# Patient Record
Sex: FEMALE | NOT HISPANIC OR LATINO
[De-identification: names, ages, dates, MRNs, and addresses within clinical notes are randomized per-mention and may not be internally consistent; named-entity substitution may affect disease eponyms.]

---

## 2019-03-11 PROBLEM — Z00.00 ENCOUNTER FOR PREVENTIVE HEALTH EXAMINATION: Status: ACTIVE | Noted: 2019-03-11

## 2019-03-12 ENCOUNTER — APPOINTMENT (OUTPATIENT)
Dept: OTOLARYNGOLOGY | Facility: CLINIC | Age: 59
End: 2019-03-12

## 2019-03-22 ENCOUNTER — APPOINTMENT (OUTPATIENT)
Dept: OTOLARYNGOLOGY | Facility: CLINIC | Age: 59
End: 2019-03-22
Payer: MEDICAID

## 2019-03-22 ENCOUNTER — LABORATORY RESULT (OUTPATIENT)
Age: 59
End: 2019-03-22

## 2019-03-22 VITALS
SYSTOLIC BLOOD PRESSURE: 99 MMHG | DIASTOLIC BLOOD PRESSURE: 70 MMHG | WEIGHT: 135 LBS | OXYGEN SATURATION: 94 % | HEART RATE: 76 BPM

## 2019-03-22 DIAGNOSIS — Z78.9 OTHER SPECIFIED HEALTH STATUS: ICD-10-CM

## 2019-03-22 DIAGNOSIS — K11.9 DISEASE OF SALIVARY GLAND, UNSPECIFIED: ICD-10-CM

## 2019-03-22 PROCEDURE — 10021 FNA BX W/O IMG GDN 1ST LES: CPT

## 2019-03-22 PROCEDURE — 99204 OFFICE O/P NEW MOD 45 MIN: CPT | Mod: 25

## 2019-03-22 NOTE — REASON FOR VISIT
[Initial Consultation] : an initial consultation for [FreeTextEntry2] : new patient. swelling,mass and lump in neck

## 2019-03-22 NOTE — PHYSICAL EXAM
[Midline] : trachea located in midline position [Normal] : no rashes [de-identified] : 2.5 cm mobile, nontender R tail of the parotid mass.

## 2019-03-22 NOTE — CONSULT LETTER
[Dear  ___] : Dear  [unfilled], [Consult Letter:] : I had the pleasure of evaluating your patient, [unfilled]. [Please see my note below.] : Please see my note below. [Consult Closing:] : Thank you very much for allowing me to participate in the care of this patient.  If you have any questions, please do not hesitate to contact me. [Sincerely,] : Sincerely, [FreeTextEntry2] : Dr Bolivar Alvarez [FreeTextEntry3] : \par Ranjith Garsia MD, FACS\par \par Otolaryngology-Head and Neck Surgery\par [employer] [facility]\par \par

## 2019-03-22 NOTE — HISTORY OF PRESENT ILLNESS
[de-identified] : Patient referred by Dr Alvarez for R facial mass. \par First noticed 10 months ago. \par Denies pain, dysphagia, odynophagia, dyspnea, dysphonia, nasal obstruction/bleeding, fever, weakness or weight loss.\par No previous history of skin cancer.\par \par

## 2019-04-01 ENCOUNTER — OUTPATIENT (OUTPATIENT)
Dept: OUTPATIENT SERVICES | Facility: HOSPITAL | Age: 59
LOS: 1 days | End: 2019-04-01

## 2019-04-01 VITALS
OXYGEN SATURATION: 98 % | HEART RATE: 71 BPM | SYSTOLIC BLOOD PRESSURE: 98 MMHG | HEIGHT: 63 IN | DIASTOLIC BLOOD PRESSURE: 66 MMHG | RESPIRATION RATE: 14 BRPM | TEMPERATURE: 98 F | WEIGHT: 110.01 LBS

## 2019-04-01 DIAGNOSIS — Z98.890 OTHER SPECIFIED POSTPROCEDURAL STATES: Chronic | ICD-10-CM

## 2019-04-01 DIAGNOSIS — K11.9 DISEASE OF SALIVARY GLAND, UNSPECIFIED: ICD-10-CM

## 2019-04-01 LAB
ANION GAP SERPL CALC-SCNC: 12 MMO/L — SIGNIFICANT CHANGE UP (ref 7–14)
BLD GP AB SCN SERPL QL: NEGATIVE — SIGNIFICANT CHANGE UP
BUN SERPL-MCNC: 20 MG/DL — SIGNIFICANT CHANGE UP (ref 7–23)
CALCIUM SERPL-MCNC: 9 MG/DL — SIGNIFICANT CHANGE UP (ref 8.4–10.5)
CHLORIDE SERPL-SCNC: 106 MMOL/L — SIGNIFICANT CHANGE UP (ref 98–107)
CO2 SERPL-SCNC: 26 MMOL/L — SIGNIFICANT CHANGE UP (ref 22–31)
CREAT SERPL-MCNC: 0.52 MG/DL — SIGNIFICANT CHANGE UP (ref 0.5–1.3)
GLUCOSE SERPL-MCNC: 103 MG/DL — HIGH (ref 70–99)
HCT VFR BLD CALC: 39.6 % — SIGNIFICANT CHANGE UP (ref 34.5–45)
HGB BLD-MCNC: 12.6 G/DL — SIGNIFICANT CHANGE UP (ref 11.5–15.5)
MCHC RBC-ENTMCNC: 30.2 PG — SIGNIFICANT CHANGE UP (ref 27–34)
MCHC RBC-ENTMCNC: 31.8 % — LOW (ref 32–36)
MCV RBC AUTO: 95 FL — SIGNIFICANT CHANGE UP (ref 80–100)
NRBC # FLD: 0 K/UL — SIGNIFICANT CHANGE UP (ref 0–0)
PLATELET # BLD AUTO: 243 K/UL — SIGNIFICANT CHANGE UP (ref 150–400)
PMV BLD: 9 FL — SIGNIFICANT CHANGE UP (ref 7–13)
POTASSIUM SERPL-MCNC: 3.9 MMOL/L — SIGNIFICANT CHANGE UP (ref 3.5–5.3)
POTASSIUM SERPL-SCNC: 3.9 MMOL/L — SIGNIFICANT CHANGE UP (ref 3.5–5.3)
RBC # BLD: 4.17 M/UL — SIGNIFICANT CHANGE UP (ref 3.8–5.2)
RBC # FLD: 12.1 % — SIGNIFICANT CHANGE UP (ref 10.3–14.5)
RH IG SCN BLD-IMP: POSITIVE — SIGNIFICANT CHANGE UP
SODIUM SERPL-SCNC: 144 MMOL/L — SIGNIFICANT CHANGE UP (ref 135–145)
WBC # BLD: 8.2 K/UL — SIGNIFICANT CHANGE UP (ref 3.8–10.5)
WBC # FLD AUTO: 8.2 K/UL — SIGNIFICANT CHANGE UP (ref 3.8–10.5)

## 2019-04-01 PROCEDURE — 93010 ELECTROCARDIOGRAM REPORT: CPT

## 2019-04-01 RX ORDER — SODIUM CHLORIDE 9 MG/ML
1000 INJECTION, SOLUTION INTRAVENOUS
Qty: 0 | Refills: 0 | Status: DISCONTINUED | OUTPATIENT
Start: 2019-04-03 | End: 2019-04-03

## 2019-04-01 NOTE — H&P PST ADULT - NSICDXPASTSURGICALHX_GEN_ALL_CORE_FT
PAST SURGICAL HISTORY:  H/O colonoscopy     History of surgery Right side of chest scar noted - " I had surgery 30 years ago, because I couldn't breath, but I don't know the name , it was in [place] "

## 2019-04-01 NOTE — H&P PST ADULT - NEGATIVE OPHTHALMOLOGIC SYMPTOMS
no discharge R/no pain L/no irritation R/no photophobia/no discharge L/no irritation L/no diplopia/no blurred vision L/no pain R/no blurred vision R

## 2019-04-01 NOTE — H&P PST ADULT - MUSCULOSKELETAL
details… detailed exam no joint swelling/no joint erythema/ROM intact/normal strength/no calf tenderness/no joint warmth

## 2019-04-01 NOTE — H&P PST ADULT - NEGATIVE MUSCULOSKELETAL SYMPTOMS
no muscle cramps/no joint swelling/no arthritis/no stiffness/no neck pain/no arm pain R/no myalgia/no arm pain L/no back pain/no muscle weakness/no leg pain L

## 2019-04-01 NOTE — H&P PST ADULT - NEGATIVE ENMT SYMPTOMS
no ear pain/no nasal discharge/no post-nasal discharge/no nose bleeds/no dry mouth/no recurrent cold sores/no throat pain/no dysphagia/no tinnitus/no hearing difficulty/no vertigo/no sinus symptoms/no nasal congestion/no nasal obstruction

## 2019-04-01 NOTE — H&P PST ADULT - HISTORY OF PRESENT ILLNESS
Patient is a 58 year old female with a history of Right side of neck swelling, mass lump that started approximately 6 months ago. Patient is s/p MRI with abnormal results. Patient was referred to Dr. Garsia for an evaluation. 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 Patient is scheduled for Right superficial possible total parotidectomy possible right neck dissection scheduled on 4/3/2019.

## 2019-04-01 NOTE — H&P PST ADULT - ASSESSMENT
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 Patient is scheduled for Right superficial possible total parotidectomy possible right neck dissection scheduled on 4/3/2019.

## 2019-04-01 NOTE — H&P PST ADULT - RS GEN PE MLT RESP DETAILS PC
good air movement/no rales/breath sounds equal/clear to auscultation bilaterally/no wheezes/respirations non-labored/airway patent

## 2019-04-01 NOTE — H&P PST ADULT - NEGATIVE GASTROINTESTINAL SYMPTOMS
no change in bowel habits/no diarrhea/no nausea/no constipation/no abdominal pain/no melena/no vomiting

## 2019-04-01 NOTE — H&P PST ADULT - NSANTHOSAYNRD_GEN_A_CORE
No. DARIA screening performed.  STOP BANG Legend: 0-2 = LOW Risk; 3-4 = INTERMEDIATE Risk; 5-8 = HIGH Risk

## 2019-04-01 NOTE — H&P PST ADULT - NEGATIVE GENERAL GENITOURINARY SYMPTOMS
no flank pain L/normal urinary frequency/no hematuria/no dysuria/no urine discoloration/no incontinence/no flank pain R/no bladder infections/no urinary hesitancy

## 2019-04-01 NOTE — H&P PST ADULT - NEGATIVE ALLERGY TYPES
no reactions to animals/no reactions to medicines/no reactions to food/no reactions to insect bites/no outdoor environmental allergies/no indoor environmental allergies

## 2019-04-01 NOTE — H&P PST ADULT - NEGATIVE NEUROLOGICAL SYMPTOMS
no focal seizures/no loss of sensation/no loss of consciousness/no paresthesias/no transient paralysis/no difficulty walking/no weakness/no tremors/no generalized seizures/no vertigo/no headache/no confusion

## 2019-04-01 NOTE — H&P PST ADULT - NSICDXPROBLEM_GEN_ALL_CORE_FT
PROBLEM DIAGNOSES  Problem: Disease of salivary gland, unspecified  Assessment and Plan: Patient is scheduled for Right superficial possible total parotidectomy possible right neck dissection scheduled on 4/3/2019.   Preop instructions, pepcid, surgical scrub provided. Pt and daughter stated understanding.  Pending medical evaluation per PST- Patient is a poor historian - Reports had a surgery on Right side of chest 30 years ago Does not know what kind of surgery  " I had difficulty breathing, so they did surgery " - Dr. Paula Vizcaino -[phone]. ( Patient and daughter verbalized understanding to obtain Medical evaluation )   Case discussed with Dr. Shafer

## 2019-04-01 NOTE — H&P PST ADULT - NECK DETAILS
Neck ROM: WDL / Right side of Neck lump noted , nontender, mobile- Pre op diagnosis: Disease of salivary gland, unspecified/supple/no JVD

## 2019-04-02 ENCOUNTER — TRANSCRIPTION ENCOUNTER (OUTPATIENT)
Age: 59
End: 2019-04-02

## 2019-04-02 NOTE — ASU PATIENT PROFILE, ADULT - PSH
H/O colonoscopy    History of surgery  Right side of chest scar noted - " I had surgery 30 years ago, because I couldn't breath, but I don't know the name , it was in [place] "

## 2019-04-03 ENCOUNTER — APPOINTMENT (OUTPATIENT)
Dept: OTOLARYNGOLOGY | Facility: HOSPITAL | Age: 59
End: 2019-04-03

## 2019-04-03 ENCOUNTER — RESULT REVIEW (OUTPATIENT)
Age: 59
End: 2019-04-03

## 2019-04-03 ENCOUNTER — INPATIENT (INPATIENT)
Facility: HOSPITAL | Age: 59
LOS: 0 days | Discharge: HOME | End: 2019-04-03
Attending: OTOLARYNGOLOGY | Admitting: OTOLARYNGOLOGY
Payer: COMMERCIAL

## 2019-04-03 VITALS
OXYGEN SATURATION: 97 % | RESPIRATION RATE: 16 BRPM | DIASTOLIC BLOOD PRESSURE: 63 MMHG | SYSTOLIC BLOOD PRESSURE: 112 MMHG | HEART RATE: 72 BPM

## 2019-04-03 VITALS
RESPIRATION RATE: 18 BRPM | HEART RATE: 70 BPM | DIASTOLIC BLOOD PRESSURE: 58 MMHG | SYSTOLIC BLOOD PRESSURE: 118 MMHG | OXYGEN SATURATION: 97 % | HEIGHT: 63 IN | TEMPERATURE: 98 F | WEIGHT: 110.01 LBS

## 2019-04-03 DIAGNOSIS — K11.9 DISEASE OF SALIVARY GLAND, UNSPECIFIED: ICD-10-CM

## 2019-04-03 DIAGNOSIS — Z98.890 OTHER SPECIFIED POSTPROCEDURAL STATES: Chronic | ICD-10-CM

## 2019-04-03 LAB — RH IG SCN BLD-IMP: POSITIVE — SIGNIFICANT CHANGE UP

## 2019-04-03 PROCEDURE — 42420 EXCISE PAROTID GLAND/LESION: CPT

## 2019-04-03 PROCEDURE — 88307 TISSUE EXAM BY PATHOLOGIST: CPT | Mod: 26

## 2019-04-03 RX ORDER — SODIUM CHLORIDE 9 MG/ML
1000 INJECTION, SOLUTION INTRAVENOUS
Qty: 0 | Refills: 0 | Status: DISCONTINUED | OUTPATIENT
Start: 2019-04-03 | End: 2019-04-03

## 2019-04-03 RX ORDER — FENTANYL CITRATE 50 UG/ML
25 INJECTION INTRAVENOUS
Qty: 0 | Refills: 0 | Status: DISCONTINUED | OUTPATIENT
Start: 2019-04-03 | End: 2019-04-03

## 2019-04-03 RX ORDER — OXYCODONE AND ACETAMINOPHEN 5; 325 MG/1; MG/1
1 TABLET ORAL EVERY 4 HOURS
Qty: 0 | Refills: 0 | Status: DISCONTINUED | OUTPATIENT
Start: 2019-04-03 | End: 2019-04-03

## 2019-04-03 RX ORDER — ONDANSETRON 8 MG/1
4 TABLET, FILM COATED ORAL ONCE
Qty: 0 | Refills: 0 | Status: DISCONTINUED | OUTPATIENT
Start: 2019-04-03 | End: 2019-04-03

## 2019-04-03 RX ORDER — OXYCODONE AND ACETAMINOPHEN 5; 325 MG/1; MG/1
2 TABLET ORAL EVERY 6 HOURS
Qty: 0 | Refills: 0 | Status: DISCONTINUED | OUTPATIENT
Start: 2019-04-03 | End: 2019-04-03

## 2019-04-03 RX ADMIN — SODIUM CHLORIDE 50 MILLILITER(S): 9 INJECTION, SOLUTION INTRAVENOUS at 11:20

## 2019-04-03 NOTE — ASU DISCHARGE PLAN (ADULT/PEDIATRIC) - CARE PROVIDER_API CALL
Ranjith Garsia)  Otolaryngology  [address] [place] [address]  Phone: [phone]  Fax: [phone]  Follow Up Time:

## 2019-04-03 NOTE — ASU DISCHARGE PLAN (ADULT/PEDIATRIC) - NURSING INSTRUCTIONS
No heavy lifting or straining for 2 weeks. May shower on Friday. Make follow up appointment with MD. If any bleeding that does not stop, redness or swelling from incision site, notify MD. DO NOT take any Tylenol (Acetaminophen) or narcotics containing Tylenol until after  __6pm____ . You received Tylenol during your operation and it can cause damage to your liver if too much is taken within a 24 hour time period. Pt offering no c/o pain. Pt chester PO. OOB walking No heavy lifting or straining for 2 weeks. May shower on Friday. Make follow up appointment with MD. If any bleeding that does not stop, redness or swelling from incision site, notify MD. DO NOT take any Tylenol (Acetaminophen) or narcotics containing Tylenol until after  __6pm____ . You received Tylenol during your operation and it can cause damage to your liver if too much is taken within a 24 hour time period. Pt offering no c/o pain. Pt chester PO. OOB walking. Empty DIPIKA drain twice a day and record output.

## 2019-04-03 NOTE — ASU DISCHARGE PLAN (ADULT/PEDIATRIC) - ASU DC SPECIAL INSTRUCTIONSFT
leave steri strips in place until your postoperative visit  okay to shower in 48 hours  record drain output twice a day    follow up with Dr. Garsia on 4/8/19. Please call [phone] to confirm appointment

## 2019-04-04 ENCOUNTER — APPOINTMENT (OUTPATIENT)
Dept: OTOLARYNGOLOGY | Facility: CLINIC | Age: 59
End: 2019-04-04
Payer: MEDICAID

## 2019-04-04 VITALS
HEART RATE: 67 BPM | SYSTOLIC BLOOD PRESSURE: 111 MMHG | DIASTOLIC BLOOD PRESSURE: 73 MMHG | TEMPERATURE: 36.7 F | WEIGHT: 108 LBS | HEIGHT: 63 IN | BODY MASS INDEX: 19.14 KG/M2

## 2019-04-04 PROBLEM — K11.9 DISEASE OF SALIVARY GLAND, UNSPECIFIED: Chronic | Status: ACTIVE | Noted: 2019-04-01

## 2019-04-04 PROCEDURE — 99024 POSTOP FOLLOW-UP VISIT: CPT

## 2019-04-04 RX ORDER — MULTIVITAMIN
TABLET ORAL
Refills: 0 | Status: ACTIVE | COMMUNITY

## 2019-04-07 NOTE — PHYSICAL EXAM
[Midline] : trachea located in midline position [Normal] : no rashes [de-identified] : Incision C/D/I. No signs of infection or fluid collection.

## 2019-04-07 NOTE — HISTORY OF PRESENT ILLNESS
[de-identified] : Patient referred by Dr Alvarez for R facial mass. \par First noticed 10 months ago. \par Had no pain, dysphagia, odynophagia, dyspnea, dysphonia, nasal obstruction/bleeding, fever, weakness or weight loss.\par No previous history of skin cancer.\par FNA suggestive of Pleomorphic Adenoma.\par S/P R parotidectomy. Comes in today to remove DIPIKA drain.\par \par

## 2019-04-18 ENCOUNTER — APPOINTMENT (OUTPATIENT)
Dept: OTOLARYNGOLOGY | Facility: CLINIC | Age: 59
End: 2019-04-18
Payer: MEDICAID

## 2019-04-18 VITALS — HEART RATE: 86 BPM | SYSTOLIC BLOOD PRESSURE: 110 MMHG | DIASTOLIC BLOOD PRESSURE: 74 MMHG

## 2019-04-18 PROCEDURE — 99024 POSTOP FOLLOW-UP VISIT: CPT

## 2019-04-18 RX ORDER — ONDANSETRON 8 MG/1
8 TABLET ORAL
Qty: 6 | Refills: 0 | Status: COMPLETED | COMMUNITY
Start: 2019-04-04 | End: 2019-04-18

## 2019-04-18 RX ORDER — OXYCODONE AND ACETAMINOPHEN TABLETS 10; 300 MG/1; MG/1
TABLET ORAL
Refills: 0 | Status: COMPLETED | COMMUNITY
End: 2019-04-18

## 2019-04-25 NOTE — HISTORY OF PRESENT ILLNESS
[de-identified] : S/P right parotidectomy 4/3. Some pain and feels like sometimes it turns red.No pain when eating. No swelling or drainage.

## 2019-04-25 NOTE — PHYSICAL EXAM
[Midline] : trachea located in midline position [Normal] : no rashes [de-identified] : Incision C/D/I. No signs of infection or fluid collection.

## 2019-04-25 NOTE — CONSULT LETTER
[Courtesy Letter:] : I had the pleasure of seeing your patient, [unfilled], in my office today. [Dear  ___] : Dear  [unfilled], [Consult Closing:] : Thank you very much for allowing me to participate in the care of this patient.  If you have any questions, please do not hesitate to contact me. [Please see my note below.] : Please see my note below. [Sincerely,] : Sincerely, [FreeTextEntry2] : Dr Bolivar Alvarez  [FreeTextEntry3] : \par Ranjith Garsia MD, FACS\par \par Otolaryngology-Head and Neck Surgery\par [employer] [facility]\par

## 2019-07-18 ENCOUNTER — APPOINTMENT (OUTPATIENT)
Dept: OTOLARYNGOLOGY | Facility: CLINIC | Age: 59
End: 2019-07-18

## 2023-07-15 NOTE — H&P PST ADULT - GASTROINTESTINAL DETAILS
Addended by: JENNIFER PATRICK on: 7/15/2023 08:19 AM     Modules accepted: Orders    
no rebound tenderness/no distention/no guarding/bowel sounds normal/soft/nontender

## 2025-05-14 ENCOUNTER — APPOINTMENT (OUTPATIENT)
Dept: HEART AND VASCULAR | Facility: CLINIC | Age: 65
End: 2025-05-14
Payer: COMMERCIAL

## 2025-05-14 VITALS
HEART RATE: 90 BPM | HEIGHT: 63 IN | WEIGHT: 110 LBS | SYSTOLIC BLOOD PRESSURE: 108 MMHG | RESPIRATION RATE: 16 BRPM | BODY MASS INDEX: 19.49 KG/M2 | DIASTOLIC BLOOD PRESSURE: 72 MMHG

## 2025-05-14 DIAGNOSIS — R07.9 CHEST PAIN, UNSPECIFIED: ICD-10-CM

## 2025-05-14 PROCEDURE — G2211 COMPLEX E/M VISIT ADD ON: CPT

## 2025-05-14 PROCEDURE — 99204 OFFICE O/P NEW MOD 45 MIN: CPT

## 2025-09-03 ENCOUNTER — APPOINTMENT (OUTPATIENT)
Dept: HEART AND VASCULAR | Facility: CLINIC | Age: 65
End: 2025-09-03
Payer: COMMERCIAL

## 2025-09-03 VITALS
BODY MASS INDEX: 19.49 KG/M2 | SYSTOLIC BLOOD PRESSURE: 107 MMHG | RESPIRATION RATE: 16 BRPM | DIASTOLIC BLOOD PRESSURE: 71 MMHG | WEIGHT: 110 LBS | HEIGHT: 63 IN | HEART RATE: 83 BPM

## 2025-09-03 PROCEDURE — G2211 COMPLEX E/M VISIT ADD ON: CPT | Mod: NC

## 2025-09-03 PROCEDURE — 99213 OFFICE O/P EST LOW 20 MIN: CPT
